# Patient Record
Sex: FEMALE | Race: WHITE | NOT HISPANIC OR LATINO | URBAN - METROPOLITAN AREA
[De-identification: names, ages, dates, MRNs, and addresses within clinical notes are randomized per-mention and may not be internally consistent; named-entity substitution may affect disease eponyms.]

---

## 2025-02-06 ENCOUNTER — OFFICE VISIT (OUTPATIENT)
Dept: URGENT CARE | Age: 45
End: 2025-02-06
Payer: COMMERCIAL

## 2025-02-06 VITALS
OXYGEN SATURATION: 98 % | SYSTOLIC BLOOD PRESSURE: 135 MMHG | TEMPERATURE: 98.1 F | HEART RATE: 86 BPM | DIASTOLIC BLOOD PRESSURE: 92 MMHG | RESPIRATION RATE: 15 BRPM

## 2025-02-06 DIAGNOSIS — K04.7 DENTAL INFECTION: Primary | ICD-10-CM

## 2025-02-06 RX ORDER — LEVOFLOXACIN 500 MG/1
750 TABLET, FILM COATED ORAL DAILY
Qty: 11 TABLET | Refills: 0 | Status: SHIPPED | OUTPATIENT
Start: 2025-02-06 | End: 2025-02-13

## 2025-02-06 RX ORDER — METRONIDAZOLE 500 MG/1
500 TABLET ORAL 3 TIMES DAILY
Qty: 21 TABLET | Refills: 0 | Status: SHIPPED | OUTPATIENT
Start: 2025-02-06 | End: 2025-02-13

## 2025-02-06 ASSESSMENT — ENCOUNTER SYMPTOMS
RESPIRATORY NEGATIVE: 1
CONSTITUTIONAL NEGATIVE: 1
GASTROINTESTINAL NEGATIVE: 1
CARDIOVASCULAR NEGATIVE: 1

## 2025-02-06 NOTE — PROGRESS NOTES
Subjective   Patient ID: Carli Steven is a 44 y.o. female. They present today with a chief complaint of Other (Tooth pain X 1 day. TD-MA).    History of Present Illness  HPI  Patient presents to the urgent care for a chief complaint of left-sided lower dental infection as patient has a cracked tooth.  Patient does have dental appointment scheduled for next week.  Patient reports that did have some facial swelling yesterday prompting visit to the urgent care, no report of respiratory distress  Past Medical History  Allergies as of 02/06/2025 - Reviewed 02/06/2025   Allergen Reaction Noted    Amoxicillin Hives 02/06/2025       (Not in a hospital admission)       Past Medical History:   Diagnosis Date    Localized enlarged lymph nodes 04/18/2016    LAD (lymphadenopathy) of left cervical region    Personal history of other infectious and parasitic diseases     History of infection due to human papilloma virus (HPV)    Personal history of other specified conditions     History of abnormal Pap smear    Unspecified abnormal findings in urine 02/13/2018    Abnormal urine odor    Urinary tract infection, site not specified     Acute UTI       Past Surgical History:   Procedure Laterality Date    CT HEAD ANGIO W AND WO IV CONTRAST  11/3/2017    CT HEAD ANGIO W AND WO IV CONTRAST 11/3/2017 [facility] [place] [facility]    OTHER SURGICAL HISTORY  03/22/2018    Breast Reconstruction With Implant Prosthesis Bilateral    TUBAL LIGATION  03/22/2018    Tubal Ligation        reports that she has never smoked. She has never used smokeless tobacco.    Review of Systems  Review of Systems   Constitutional: Negative.    HENT:  Positive for dental problem.    Respiratory: Negative.     Cardiovascular: Negative.    Gastrointestinal: Negative.                                   Objective    Vitals:    02/06/25 1051   BP: (!) 135/92   Pulse: 86   Resp: 15   Temp: 36.7 °C (98.1 °F)   SpO2: 98%     No LMP recorded.    Physical Exam  Vitals and nursing  note reviewed.   Constitutional:       General: She is not in acute distress.     Appearance: Normal appearance. She is not ill-appearing, toxic-appearing or diaphoretic.   HENT:      Mouth/Throat:      Comments: Upon examination of left lower jaw there is the presence of dental decay, presence of cracked tooth approximately tooth 19 or 18, minimal facial swelling of lower jaw does not go past jawline no report of respiratory distress patient speaking in full sentences no audible stridor or wheeze no muffled or garbled voice able to handle oral secretions  Cardiovascular:      Rate and Rhythm: Normal rate.   Pulmonary:      Effort: Pulmonary effort is normal. No respiratory distress.   Skin:     Findings: No rash.   Neurological:      General: No focal deficit present.      Mental Status: She is alert and oriented to person, place, and time.   Psychiatric:         Mood and Affect: Mood normal.         Behavior: Behavior normal.         Procedures    Point of Care Test & Imaging Results from this visit  No results found for this visit on 02/06/25.   No results found.    Diagnostic study results (if any) were reviewed by [facility].    Assessment/Plan   Allergies, medications, history, and pertinent labs/EKGs/Imaging reviewed by Wilder Trammell PA-C.     Medical Decision Making  Due to patient still an allergy patient replaced on levofloxacin along with metronidazole, patient is to keep dental appointment.  Patient is to go to the emergency room or call 911 if any signs of respiratory distress such as audible stridor or wheeze muffled or garbled voice inability swallow solids or liquids or if significant swelling below the jawline.  Patient verbalized understanding is agreeable to plan discharge emergent care A+O x 4 stable condition no signs of distress    Orders and Diagnoses  Diagnoses and all orders for this visit:  Dental infection  -     metroNIDAZOLE (Flagyl) 500 mg tablet; Take 1 tablet  (500 mg) by mouth 3 times a day for 7 days.  -     levoFLOXacin (Levaquin) 500 mg tablet; Take 1.5 tablets (750 mg) by mouth once daily for 7 days.      Medical Admin Record      Patient disposition: Home    Electronically signed by [facility]  11:00 AM